# Patient Record
Sex: MALE | Race: BLACK OR AFRICAN AMERICAN | Employment: FULL TIME | URBAN - METROPOLITAN AREA
[De-identification: names, ages, dates, MRNs, and addresses within clinical notes are randomized per-mention and may not be internally consistent; named-entity substitution may affect disease eponyms.]

---

## 2019-10-10 ENCOUNTER — HOSPITAL ENCOUNTER (OUTPATIENT)
Dept: PHYSICAL THERAPY | Age: 29
Discharge: HOME | End: 2019-10-10
Payer: COMMERCIAL

## 2019-10-10 PROCEDURE — 97162 PT EVAL MOD COMPLEX 30 MIN: CPT

## 2019-10-10 PROCEDURE — 97530 THERAPEUTIC ACTIVITIES: CPT

## 2019-10-10 PROCEDURE — 97110 THERAPEUTIC EXERCISES: CPT
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
## 2019-10-17 ENCOUNTER — HOSPITAL ENCOUNTER (OUTPATIENT)
Dept: PHYSICAL THERAPY | Age: 29
Discharge: HOME | End: 2019-10-17
Payer: COMMERCIAL

## 2019-10-17 PROCEDURE — 97110 THERAPEUTIC EXERCISES: CPT

## 2019-10-17 NOTE — PROGRESS NOTES
PT DAILY TREATMENT NOTE    Patient Name: Johnathon Delarosa  Date:10/17/2019  : 1990  [x]  Patient  Verified  Payor: Josefina Harley / Plan: [address] [place] [address] / Product Type: PPO /    In time:9:20  Out time:10:00  Total Treatment Time (min): 40  Total Timed Codes (min): 40  1:1 Treatment Time ( only): 40   Visit #: 2 of 12    Treatment Area: Back pain [M54.9]    SUBJECTIVE  Pain Level (0-10 scale): 6  Any medication changes, allergies to medications, adverse drug reactions, diagnosis change, or new procedure performed?: [x] No    [] Yes (see summary sheet for update)  Subjective functional status/changes:   [] No changes reported  Just stiff    OBJECTIVE    Modalities Rationale:     decrease edema, decrease inflammation and decrease pain to improve patient's ability to stand for prolonged periods. min [] Estim, type/location:                                      []  att     []  unatt     []  w/US     []  w/ice    []  w/heat    min []  Mechanical Traction: type/lbs                   []  pro   []  sup   []  int   []  cont    []  before manual    []  after manual    min []  Ultrasound, settings/location:      min []  Iontophoresis w/ dexamethasone, location:                                               []  take home patch       []  in clinic    min []  Ice     []  Heat    location/position:     min []  Vasopneumatic Device, press/temp:     min []  Other:    [] Skin assessment post-treatment (if applicable):    []  intact    []  redness- no adverse reaction     []redness  adverse reaction:        40 min Therapeutic Exercise:  [] See flow sheet :   Rationale: increase ROM, increase strength and improve coordination to improve the patients ability to do ADLs.     With   [x] TE   [] TA   [] neuro   [] other: Patient Education: [x] Review HEP    [] Progressed/Changed HEP based on:   [] positioning   [] body mechanics   [] transfers   [] heat/ice application    [] other:      Other Objective/Functional Measures: Pt challenged with maintaining PPT improved with cueing. Pain Level (0-10 scale) post treatment: Pt challenged with rib depression and pelvic tilting during 90/90s. Unable to reach during post capsule stretch. ASSESSMENT/Changes in Function: 3    Patient will continue to benefit from skilled PT services to modify and progress therapeutic interventions, address functional mobility deficits, address ROM deficits, address strength deficits and analyze and address soft tissue restrictions to attain remaining goals. []  See Plan of Care  []  See progress note/recertification  []  See Discharge Summary         Progress towards goals / Updated goals:  1.  Pt will be independent with HEP to encourage prophylaxis. Eval:dispensed,to be updated   Current: added cat camel today     Long Term Goals: LTG- To be accomplished in 12 visit(s):  1.  Pt will improve trunk rotation to 15 in or less and forward flexion to floor without pain to allow pt to return to PLOF .  Eval:Right 19.5 in Left 20 in with pain, forward flexion 6 in from floor   Current:     2.  Pt will be able to work entire work day without pain after to allow pt to return PLOF. Eval:pain at end of day   current:     3.  Pt will be able to perform >10 bridges without pain to indicate functional glut and hamstring strength.   Eval: decreased height with one bridge and increased ext    Current:     4.  Pt FOTO score will increase by 20 points to show improvement in function. Eval:48   Current: will address at visit 5    PLAN  []  Upgrade activities as tolerated     []  Continue plan of care  []  Update interventions per flow sheet       []  Discharge due to:_  []  Other:_      Ivonne Melgar, PT 10/17/2019  9:25 AM    No future appointments.

## 2019-11-01 ENCOUNTER — HOSPITAL ENCOUNTER (OUTPATIENT)
Dept: PHYSICAL THERAPY | Age: 29
Discharge: HOME | End: 2019-11-01
Payer: COMMERCIAL

## 2019-11-01 PROCEDURE — 97110 THERAPEUTIC EXERCISES: CPT

## 2019-11-01 NOTE — PROGRESS NOTES
PT DAILY TREATMENT NOTE    Patient Name: Micheline Duarte  Date:2019  : 1990  [x]  Patient  Verified  Payor: Carina Andrews / Plan: [address] [place] [address] / Product Type: PPO /    In time:2:14 pm   Out time:3:10 pm   Total Treatment Time (min): 56  Total Timed Codes (min): 46  1:1 Treatment Time ( only): 55   Visit #: 3 of 12    Treatment Area: Back pain [M54.9]    SUBJECTIVE  Pain Level (0-10 scale): 6  Any medication changes, allergies to medications, adverse drug reactions, diagnosis change, or new procedure performed?: [x] No    [] Yes (see summary sheet for update)  Subjective functional status/changes:   [] No changes reported  \"Still kind of stiff. My left calf (indicated achilles) was really hurting the other day. \"    OBJECTIVE    Modalities Rationale:     decrease pain and increase tissue extensibility to improve patient's ability to perform daily activities with decreased pain and symptom levels     min [] Estim, type/location:                                      []  att     []  unatt     []  w/US     []  w/ice    []  w/heat    min []  Mechanical Traction: type/lbs                   []  pro   []  sup   []  int   []  cont    []  before manual    []  after manual    min []  Ultrasound, settings/location:      min []  Iontophoresis w/ dexamethasone, location:                                               []  take home patch       []  in clinic   10 min []  Ice     [x]  Heat    location/position: To L-spine in supine with LE elevated      min []  Vasopneumatic Device, press/temp:     min []  Other:    [x] Skin assessment post-treatment (if applicable):    [x]  intact    []  redness- no adverse reaction     []redness  adverse reaction:          46 min Therapeutic Exercise:  [x] See flow sheet :   Rationale: increase ROM, increase strength, improve coordination and increase proprioception to improve the patients ability to perform daily activities with decreased pain and symptom levels With   [] TE   [] TA   [] neuro   [] other: Patient Education: [x] Review HEP    [] Progressed/Changed HEP based on:   [] positioning   [] body mechanics   [] transfers   [] heat/ice application    [] other:      Other Objective/Functional Measures:   Trunk Rot Right: 17 in Left 18 in      Pain Level (0-10 scale) post treatment: 4    ASSESSMENT/Changes in Function:   Pt very challenged with quadruped- overuse of pecs. Very challenged with PPT. Required max cues. Patient will continue to benefit from skilled PT services to modify and progress therapeutic interventions, address functional mobility deficits, address ROM deficits, address strength deficits, analyze and address soft tissue restrictions, analyze and cue movement patterns, analyze and modify body mechanics/ergonomics, assess and modify postural abnormalities and instruct in home and community integration to attain remaining goals. []  See Plan of Care  []  See progress note/recertification  []  See Discharge Summary         Progress towards goals / Updated goals:  1.  Pt will be independent with HEP to encourage prophylaxis. Eval:dispensed,to be updated   Current: reports partial attempts and compliance, reviewed with pt this session      Long Term Goals: LTG- To be accomplished in 12 visit(s):  1.  Pt will improve trunk rotation to 15 in or less and forward flexion to floor without pain to allow pt to return to PLOF .  Eval:Right 19.5 in Left 20 in with pain, forward flexion 6 in from floor   Current: Progressing Trunk Rot Right: 17 in Left 18 in      2.  Pt will be able to work entire work day without pain after to allow pt to return PLOF.   Eval:pain at end of day   current:      3.  Pt will be able to perform >10 bridges without pain to indicate functional glut and hamstring strength.   Eval: decreased height with one bridge and increased ext    Current:      4.  Pt FOTO score will increase by 20 points to show improvement in function.   Eval:48   Current: will address at visit 5       PLAN  [x]  Upgrade activities as tolerated     []  Continue plan of care  []  Update interventions per flow sheet       []  Discharge due to:_  []  Other:_      Adrian Ramirez PT, DPT 11/1/2019  2:32 PM    Future Appointments   Date Time Provider Ketan Ferraro   11/7/2019  9:45 AM Natalie Lock PT [facility]

## 2019-11-07 ENCOUNTER — HOSPITAL ENCOUNTER (OUTPATIENT)
Dept: PHYSICAL THERAPY | Age: 29
Discharge: HOME | End: 2019-11-07
Payer: COMMERCIAL

## 2019-11-07 PROCEDURE — 97110 THERAPEUTIC EXERCISES: CPT

## 2019-11-07 NOTE — PROGRESS NOTES
PT DAILY TREATMENT NOTE    Patient Name: Raad De La Torre  Date:2019  : 1990  [x]  Patient  Verified  Payor: Mere June / Plan: [address] [place] [address] / Product Type: PPO /    In time:11:25 pm   Out time:12:30 pm   Total Treatment Time (min): 65  Total Timed Codes (min): 55  1:1 Treatment Time ( only): 55  Visit #: 4 of 12    Treatment Area: Back pain [M54.9]    SUBJECTIVE  Pain Level (0-10 scale): 6  Any medication changes, allergies to medications, adverse drug reactions, diagnosis change, or new procedure performed?: [x] No    [] Yes (see summary sheet for update)  Subjective functional status/changes:   [] No changes reported  \"Still kind of stiff. My left calf (indicated achilles) was really hurting the other day. \"    OBJECTIVE    Modalities Rationale:     decrease pain and increase tissue extensibility to improve patient's ability to perform daily activities with decreased pain and symptom levels     min [] Estim, type/location:                                      []  att     []  unatt     []  w/US     []  w/ice    []  w/heat    min []  Mechanical Traction: type/lbs                   []  pro   []  sup   []  int   []  cont    []  before manual    []  after manual    min []  Ultrasound, settings/location:      min []  Iontophoresis w/ dexamethasone, location:                                               []  take home patch       []  in clinic   10 min []  Ice     [x]  Heat    location/position: To L-spine in supine with LE elevated      min []  Vasopneumatic Device, press/temp:     min []  Other:    [x] Skin assessment post-treatment (if applicable):    [x]  intact    []  redness- no adverse reaction     []redness  adverse reaction:          55 min Therapeutic Exercise:  [x] See flow sheet :   Rationale: increase ROM, increase strength, improve coordination and increase proprioception to improve the patients ability to perform daily activities with decreased pain and symptom levels With   [] TE   [] TA   [] neuro   [] other: Patient Education: [x] Review HEP    [] Progressed/Changed HEP based on:   [] positioning   [] body mechanics   [] transfers   [] heat/ice application    [] other:      Other Objective/Functional Measures:   Pt has improved symptoms post-repositioning; added adductor pull back with good results. Pain Level (0-10 scale) post treatment: 4    ASSESSMENT/Changes in Function:   Pt has attended 4 skilled PT visits since 10/10/19. Pt has made good progress towards goals and has improved glute control. Pt to benefit from continued skilled PT 2x/week x 4 weeks. Patient will continue to benefit from skilled PT services to modify and progress therapeutic interventions, address functional mobility deficits, address ROM deficits, address strength deficits, analyze and address soft tissue restrictions, analyze and cue movement patterns, analyze and modify body mechanics/ergonomics, assess and modify postural abnormalities and instruct in home and community integration to attain remaining goals. []  See Plan of Care  []  See progress note/recertification  []  See Discharge Summary         Progress towards goals / Updated goals:  1.  Pt will be independent with HEP to encourage prophylaxis. Eval:dispensed,to be updated   Current: reports partial attempts and compliance, reviewed with pt this session, progressing.      Long Term Goals: LTG- To be accomplished in 12 visit(s):  1.  Pt will improve trunk rotation to 15 in or less and forward flexion to floor without pain to allow pt to return to PLOF .  Eval:Right 19.5 in Left 20 in with pain, forward flexion 6 in from floor   Current: Progressing Trunk Rot Right: 17 in Left 18 in, progressing.      2.  Pt will be able to work entire work day without pain after to allow pt to return PLOF.   Eval:pain at end of day   current: less pain at the end of work day, progressing.      3.  Pt will be able to perform >10 bridges without pain to indicate functional glut and hamstring strength.   Eval: decreased height with one bridge and increased ext    Current: improved glute contraction, progressing.      4.  Pt FOTO score will increase by 20 points to show improvement in function. Eval:48   Current: 53/100, progressing.        PLAN  [x]  Upgrade activities as tolerated     [x]  Continue plan of care  []  Update interventions per flow sheet       []  Discharge due to:_  []  Other:_      Rica Boggs, PT 11/7/2019  2:32 PM    No future appointments.

## 2019-11-07 NOTE — PROGRESS NOTES
[facility] at the [address], [facility], [address] [facility]  Phone: [phone]      Fax:  [phone]    PROGRESS NOTE  Patient Name: Gisela Washington : 1990   Treatment/Medical Diagnosis: Back pain [M54.9]   Referral Source: Sabas West NP     Date of Initial Visit: 10/10/19 Attended Visits: 4 Missed Visits: 1     SUMMARY OF TREATMENT  Pt has attended 4 skilled PT visits since 10/10/19. Pt has made good progress towards goals and has improved glute control. Pt to benefit from continued skilled PT 2x/week x 4 weeks. therex for strengthening, there act for functional tolerance, neuro re-ed for coordination, manual therapy to improve ROM and soft tissue mobility, and patient education for long-term management. CURRENT STATUS  .  Pt will be independent with HEP to encourage prophylaxis. Eval:dispensed,to be updated   Current: reports partial attempts and compliance, reviewed with pt this session, progressing.      Long Term Goals: LTG- To be accomplished in 12 visit(s):  1.  Pt will improve trunk rotation to 15 in or less and forward flexion to floor without pain to allow pt to return to PLOF .  Eval:Right 19.5 in Left 20 in with pain, forward flexion 6 in from floor   Current: Progressing Trunk Rot         Right: 17 in      Left 18 in, progressing.      2.  Pt will be able to work entire work day without pain after to allow pt to return PLOF. Eval:pain at end of day   current: less pain at the end of work day, progressing.      3.  Pt will be able to perform >10 bridges without pain to indicate functional glut and hamstring strength.   Eval: decreased height with one bridge and increased ext    Current: improved glute contraction, progressing.      4.  Pt FOTO score will increase by 20 points to show improvement in function. Eval:48   Current: 53/100, progressing.        RECOMMENDATIONS  Pt to continue skilled PT for 2x/4 weeks secondary to progression towards established goals. If you have any questions/comments please contact us directly at [phone]  Thank you for allowing us to assist in the care of your patient. Therapist Signature: Ana Rosa Starr PT Date: 11/7/2019     Time: 1:28 PM   NOTE TO PHYSICIAN:  PLEASE COMPLETE THE ORDERS BELOW AND FAX TO   [facility] . If you are unable to process this request in 24 hours please contact our office:[phone]    ___ I have read the above report and request that my patient continue as recommended.   ___ I have read the above report and request that my patient continue therapy with the following changes/special instructions:_________________________________________________________   ___ I have read the above report and request that my patient be discharged from therapy.      Physician Signature:        Date:       Time:

## 2020-01-07 NOTE — PROGRESS NOTES
[facility] at the [address], [place], [address] [facility]  Phone: [phone]      Fax:  [phone]  DISCHARGE SUMMARY  Patient Name: Jorge Malcolm : 1990   Treatment/Medical Diagnosis: Back pain [M54.9]   Referral Source: Elaine Vela NP     Date of Initial Visit: 10/10/19 Attended Visits: 4 Missed Visits: 1     SUMMARY OF TREATMENT  Pt has attended 4 skilled PT visits since 10/10/19. Pt has made good progress towards goals and has improved glute control    CURRENT STATUS   Pt will be independent with HEP to encourage prophylaxis. Eval:dispensed,to be updated   Current: reports partial attempts and compliance, reviewed with pt this session, progressing.      Long Term Goals: LTG- To be accomplished in 12 visit(s):  1.  Pt will improve trunk rotation to 15 in or less and forward flexion to floor without pain to allow pt to return to PLOF .  Eval:Right 19.5 in Left 20 in with pain, forward flexion 6 in from floor   Current: Progressing Trunk Rot         Right: 17 in      Left 18 in, progressing.      2.  Pt will be able to work entire work day without pain after to allow pt to return PLOF. Eval:pain at end of day   current: less pain at the end of work day, progressing.      3.  Pt will be able to perform >10 bridges without pain to indicate functional glut and hamstring strength.   Eval: decreased height with one bridge and increased ext    Current: improved glute contraction, progressing.      4.  Pt FOTO score will increase by 20 points to show improvement in function. Eval:48   Current: 53/100, progressing.   RECOMMENDATIONS  Discontinue therapy. Progressing towards or have reached established goals. If you have any questions/comments please contact us directly at  [phone]   Thank you for allowing us to assist in the care of your patient.   Therapist Signature: Carlo Dawkins PT Date: 2020     Time: 10:11 AM        Physician Signature: ________________________________ Date: ___________ Time:_____

## 2020-02-06 ENCOUNTER — APPOINTMENT (OUTPATIENT)
Dept: PHYSICAL THERAPY | Age: 30
End: 2020-02-06

## 2020-02-28 ENCOUNTER — HOSPITAL ENCOUNTER (OUTPATIENT)
Dept: PHYSICAL THERAPY | Age: 30
Discharge: HOME | End: 2020-02-28
Payer: COMMERCIAL

## 2020-02-28 PROCEDURE — 97161 PT EVAL LOW COMPLEX 20 MIN: CPT

## 2020-02-28 PROCEDURE — 97110 THERAPEUTIC EXERCISES: CPT

## 2020-02-28 PROCEDURE — 97530 THERAPEUTIC ACTIVITIES: CPT
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
## 2020-03-04 ENCOUNTER — HOSPITAL ENCOUNTER (OUTPATIENT)
Dept: PHYSICAL THERAPY | Age: 30
Discharge: HOME | End: 2020-03-04
Payer: COMMERCIAL

## 2020-03-04 PROCEDURE — 97110 THERAPEUTIC EXERCISES: CPT

## 2020-03-04 NOTE — PROGRESS NOTES
PT DAILY TREATMENT NOTE    Patient Name: Harriet Wright  Date:3/4/2020  : 1990  [x]  Patient  Verified  Payor: Nelda Mason / Plan: [address] [place] [address] / Product Type: PPO /    In time:11:40  Out time:12:35  Total Treatment Time (min): 55  Total Timed Codes (min): 55  1:1 Treatment Time ( only): 54   Visit #: 2 of 12    Treatment Area: Lumbar back pain [M54.5]    SUBJECTIVE  Pain Level (0-10 scale): 3-4  Any medication changes, allergies to medications, adverse drug reactions, diagnosis change, or new procedure performed?: [x] No    [] Yes (see summary sheet for update)  Subjective functional status/changes:   [] No changes reported  \"homework is okay, the one on all 4's is still hard. \"    OBJECTIVE        55 min Therapeutic Exercise:  [x] See flow sheet :   Rationale: increase ROM and increase strength to improve the patients ability to perform daily activities with decreased pain and symptom levels      With   [] TE   [] TA   [] neuro   [] other: Patient Education: [x] Review HEP    [] Progressed/Changed HEP based on:   [] positioning   [] body mechanics   [] transfers   [] heat/ice application    [] other:      Other Objective/Functional Measures:   Challenged with seated hip IR due to fatigue     Pain Level (0-10 scale) post treatment: 3    ASSESSMENT/Changes in Function: Good tolerance to exercises with no increase in pain and reporting decreased tightness at end of session. Improved PPT form with use of wedge. Patient will continue to benefit from skilled PT services to modify and progress therapeutic interventions, address functional mobility deficits, address ROM deficits, address strength deficits, analyze and cue movement patterns, analyze and modify body mechanics/ergonomics, assess and modify postural abnormalities and instruct in home and community integration to attain remaining goals.      []  See Plan of Care  []  See progress note/recertification  []  See Discharge Summary Progress towards goals / Updated goals:  Short Term Goals: STG- To be accomplished in 2 week(s):  1.  Pt will be independent with HEP to encourage prophylaxis. Eval:HEP dispensed   Current: partial compliance      Long Term Goals: LTG- To be accomplished in 6 week(s):  1.  Pt will report >/=75% reduction in symptoms to be able to complete ADLs with less pain. Eval: 8/10 max pain in right low back pain at end of day, standing for long periods and bending     2.  Pt trunk rotation will improve to 16in or less to demonstrate improved functional mobilty. Eval:20.5in right, 20in left      3.  Pt will be able to squat with good form and no pain to demonstrate improved functional glut strength  Eval:increased lumbar extension, heel lift      4.  Pt FOTO score will increase by >/= 18 points to show improvement in subjective function. Eval:46  Current: will address at visit 5    PLAN  [x]  Upgrade activities as tolerated     [x]  Continue plan of care  []  Update interventions per flow sheet       []  Discharge due to:_  []  Other:_      Carry Diony 3/4/2020  12:01 PM    No future appointments.

## 2020-03-11 ENCOUNTER — HOSPITAL ENCOUNTER (OUTPATIENT)
Dept: PHYSICAL THERAPY | Age: 30
Discharge: HOME | End: 2020-03-11
Payer: COMMERCIAL

## 2020-03-11 PROCEDURE — 97110 THERAPEUTIC EXERCISES: CPT

## 2020-03-11 NOTE — PROGRESS NOTES
PT DAILY TREATMENT NOTE    Patient Name: Deyanira oNeer  Date:3/11/2020  : 1990  [x]  Patient  Verified  Payor: Patricia Smith / Plan: [address] [place] [address] / Product Type: PPO /    In time:11:33  Out time:12:47  Total Treatment Time (min): 74  Total Timed Codes (min): 74  1:1 Treatment Time ( only): 50   Visit #: 3 of 12    Treatment Area: Lumbar back pain [M54.5]    SUBJECTIVE  Pain Level (0-10 scale): 0  Any medication changes, allergies to medications, adverse drug reactions, diagnosis change, or new procedure performed?: [x] No    [] Yes (see summary sheet for update)  Subjective functional status/changes:   [] No changes reported  \"Just tight. \"    OBJECTIVE      74 min Therapeutic Exercise:  [x] See flow sheet :   Rationale: increase ROM and increase strength to improve the patients ability to perform daily activities with decreased pain and symptom levels    With   [] TE   [] TA   [] neuro   [] other: Patient Education: [x] Review HEP    [] Progressed/Changed HEP based on:   [] positioning   [] body mechanics   [] transfers   [] heat/ice application    [] other:      Other Objective/Functional Measures:   Very challenged in down dog position with easily fatiguing     Pain Level (0-10 scale) post treatment: 0    ASSESSMENT/Changes in Function: Good tolerance to exercises with no pain only tightness at end of session. Easily fatigued with down dog taps today with cues to decrease pelvic rotation due to decreased strength. Pt reporting improved compliance with HEP at home. Patient will continue to benefit from skilled PT services to modify and progress therapeutic interventions, address functional mobility deficits, address ROM deficits, address strength deficits, analyze and cue movement patterns, analyze and modify body mechanics/ergonomics, assess and modify postural abnormalities and instruct in home and community integration to attain remaining goals.      []  See Plan of Care  []  See progress note/recertification  []  See Discharge Summary         Progress towards goals / Updated goals:  Short Term Goals: STG- To be accomplished in 2 week(s):  1.  Pt will be independent with HEP to encourage prophylaxis. Eval:HEP dispensed   Current: improving compliance per pt report progressing    Long Term Goals: LTG- To be accomplished in 6 week(s):  1.  Pt will report >/=75% reduction in symptoms to be able to complete ADLs with less pain. Eval: 8/10 max pain in right low back pain at end of day, standing for long periods and bending  Current:      2.  Pt trunk rotation will improve to 16in or less to demonstrate improved functional mobilty. Eval:20.5in right, 20in left   Curent: decreased rotation noted with open book     3.  Pt will be able to squat with good form and no pain to demonstrate improved functional glut strength  Eval:increased lumbar extension, heel lift   Current: improved with verbal cues     4.  Pt FOTO score will increase by >/= 18 points to show improvement in subjective function. Eval:46  Current: will address at visit 5    PLAN  [x]  Upgrade activities as tolerated     [x]  Continue plan of care  []  Update interventions per flow sheet       []  Discharge due to:_  []  Other:_      Marcio Jean 3/11/2020  11:35 AM    No future appointments.

## 2020-03-31 ENCOUNTER — DOCUMENTATION ONLY (OUTPATIENT)
Dept: PHYSICAL THERAPY | Age: 30
End: 2020-03-31

## 2020-03-31 NOTE — PROGRESS NOTES
[facility] at [facility] [address], [place], [address] [facility]  Phone: [phone]      Fax:  [phone]    Progress Note  Patient name: Denver Izaguirre Start of Care: 2020   Referral source: Kenny Mejia NP : 1990               Medical Diagnosis: Lumbar back pain [M54.5]    Onset Date:[facility] 2020               Treatment Diagnosis: low back pain   Prior Hospitalization: see medical history Provider#: [id number]   Medications: Verified on Patient summary List    Comorbidities: HTN, hernia repaired when kid, bilateral ankle sprains    Prior Level of Function: playing basketball, lawn care, working without back pain    Visits from Start of Care: 3    Missed Visits: 0    Progress Towards Goals:   Short Term Goals: STG- To be accomplished in 2 week(s):  1.  Pt will be independent with HEP to encourage prophylaxis. Eval:HEP dispensed   Current: improving compliance per pt report progressing     Long Term Goals: LTG- To be accomplished in 6 week(s):  1.  Pt will report >/=75% reduction in symptoms to be able to complete ADLs with less pain. Eval: 8/10 max pain in right low back pain at end of day, standing for long periods and bending  Current: no pain at end of session progressing      2.  Pt trunk rotation will improve to 16in or less to demonstrate improved functional mobilty. Eval:20.5in right, 20in left   Curent: decreased rotation noted with open book progressing      3.  Pt will be able to squat with good form and no pain to demonstrate improved functional glut strength  Eval:increased lumbar extension, heel lift   Current: improved with verbal cues progressing      4.  Pt FOTO score will increase by >/= 18 points to show improvement in subjective function.   Eval:46  Current: will address at visit 5       Key Functional Changes: Pt has been making great progress towards goals however is to be placed on hold at this time due public health concerns for COVID19. Pt has been given an updated HEP with option of PT sending out an updated HEP via e-mail if needed to stay in communication with therapist. If pt's symptoms return and are unable to be managed with exercises at home pt educated to follow-up with therapist to be taken off hold as appropriate. Patient care will resume to previous established frequency when public health crisis has subsided. Updated Goals: to be achieved in 8 treatments:     See goals above    ASSESSMENT/RECOMMENDATIONS:  [x]Continue therapy per initial plan/protocol at a frequency of  2 x per week for 4 weeks  []Continue therapy with the following recommended changes:_____________________      _____________________________________________________________________  []Discontinue therapy progressing towards or have reached established goals  []Discontinue therapy due to lack of appreciable progress towards goals  []Discontinue therapy due to lack of attendance or compliance  []Await Physician's recommendations/decisions regarding therapy  []Other:________________________________________________________________    Thank you for this referral.   Pierre WREN Remesic 3/31/2020 1:19 PM  NOTE TO PHYSICIAN:  [address]   FAX TO [facility]: [phone]  If you are unable to process this request in 24 hours please contact our office: [phone]        []  I have read the above report and request that my patient continue as recommended. []  I have read the above report and request that my patient continue therapy with the following changes/special instructions:________________________________________  []I have read the above report and request that my patient be discharged from therapy.     [de-identified] Signature:____________Date:_________TIME:________    [facility], Date and Time must be completed for valid certification **
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
## 2022-02-11 ENCOUNTER — HOSPITAL ENCOUNTER (OUTPATIENT)
Dept: PREADMISSION TESTING | Age: 32
Discharge: HOME | End: 2022-02-11
Payer: COMMERCIAL

## 2022-02-11 PROCEDURE — U0003 INFECTIOUS AGENT DETECTION BY NUCLEIC ACID (DNA OR RNA); SEVERE ACUTE RESPIRATORY SYNDROME CORONAVIRUS 2 (SARS-COV-2) (CORONAVIRUS DISEASE [COVID-19]), AMPLIFIED PROBE TECHNIQUE, MAKING USE OF HIGH THROUGHPUT TECHNOLOGIES AS DESCRIBED BY CMS-2020-01-R: HCPCS

## 2022-02-12 LAB — SARS-COV-2, COV2NT: NOT DETECTED

## 2022-02-16 ENCOUNTER — HOSPITAL ENCOUNTER (OUTPATIENT)
Age: 32
Setting detail: OUTPATIENT SURGERY
Discharge: HOME | End: 2022-02-16
Attending: INTERNAL MEDICINE | Admitting: INTERNAL MEDICINE
Payer: COMMERCIAL

## 2022-02-16 VITALS
DIASTOLIC BLOOD PRESSURE: 88 MMHG | WEIGHT: 258.1 LBS | HEART RATE: 82 BPM | BODY MASS INDEX: 38.23 KG/M2 | TEMPERATURE: 96.9 F | HEIGHT: 69 IN | OXYGEN SATURATION: 100 % | RESPIRATION RATE: 16 BRPM | SYSTOLIC BLOOD PRESSURE: 155 MMHG

## 2022-02-16 PROCEDURE — 88305 TISSUE EXAM BY PATHOLOGIST: CPT

## 2022-02-16 PROCEDURE — G0500 MOD SEDAT ENDO SERVICE >5YRS: HCPCS | Performed by: INTERNAL MEDICINE

## 2022-02-16 PROCEDURE — 2709999900 HC NON-CHARGEABLE SUPPLY: Performed by: INTERNAL MEDICINE

## 2022-02-16 PROCEDURE — 76040000007: Performed by: INTERNAL MEDICINE

## 2022-02-16 PROCEDURE — 74011250636 HC RX REV CODE- 250/636: Performed by: INTERNAL MEDICINE

## 2022-02-16 PROCEDURE — 77030039961 HC KT CUST COLON BSC -D: Performed by: INTERNAL MEDICINE

## 2022-02-16 PROCEDURE — 77030013991 HC SNR POLYP ENDOSC BSC -A: Performed by: INTERNAL MEDICINE

## 2022-02-16 PROCEDURE — 77030040361 HC SLV COMPR DVT MDII -B: Performed by: INTERNAL MEDICINE

## 2022-02-16 RX ORDER — FLUMAZENIL 0.1 MG/ML
0.2 INJECTION INTRAVENOUS
Status: DISCONTINUED | OUTPATIENT
Start: 2022-02-16 | End: 2022-02-16 | Stop reason: HOSPADM

## 2022-02-16 RX ORDER — NALOXONE HYDROCHLORIDE 0.4 MG/ML
0.4 INJECTION, SOLUTION INTRAMUSCULAR; INTRAVENOUS; SUBCUTANEOUS
Status: DISCONTINUED | OUTPATIENT
Start: 2022-02-16 | End: 2022-02-16 | Stop reason: HOSPADM

## 2022-02-16 RX ORDER — FENTANYL CITRATE 50 UG/ML
100 INJECTION, SOLUTION INTRAMUSCULAR; INTRAVENOUS
Status: DISCONTINUED | OUTPATIENT
Start: 2022-02-16 | End: 2022-02-16 | Stop reason: HOSPADM

## 2022-02-16 RX ORDER — SODIUM CHLORIDE 9 MG/ML
1000 INJECTION, SOLUTION INTRAVENOUS CONTINUOUS
Status: CANCELLED | OUTPATIENT
Start: 2022-02-16 | End: 2022-02-16

## 2022-02-16 RX ORDER — ATROPINE SULFATE 0.1 MG/ML
0.5 INJECTION INTRAVENOUS
Status: CANCELLED | OUTPATIENT
Start: 2022-02-16 | End: 2022-02-17

## 2022-02-16 RX ORDER — LANOLIN ALCOHOL/MO/W.PET/CERES
1000 CREAM (GRAM) TOPICAL DAILY
COMMUNITY

## 2022-02-16 RX ORDER — HYDRALAZINE HYDROCHLORIDE 20 MG/ML
INJECTION INTRAMUSCULAR; INTRAVENOUS AS NEEDED
Status: DISCONTINUED | OUTPATIENT
Start: 2022-02-16 | End: 2022-02-16 | Stop reason: HOSPADM

## 2022-02-16 RX ORDER — SODIUM CHLORIDE 9 MG/ML
125 INJECTION, SOLUTION INTRAVENOUS CONTINUOUS
Status: DISCONTINUED | OUTPATIENT
Start: 2022-02-16 | End: 2022-02-16 | Stop reason: HOSPADM

## 2022-02-16 RX ORDER — SODIUM CHLORIDE 0.9 % (FLUSH) 0.9 %
5-40 SYRINGE (ML) INJECTION AS NEEDED
Status: CANCELLED | OUTPATIENT
Start: 2022-02-16

## 2022-02-16 RX ORDER — DIPHENHYDRAMINE HYDROCHLORIDE 50 MG/ML
50 INJECTION, SOLUTION INTRAMUSCULAR; INTRAVENOUS ONCE
Status: CANCELLED | OUTPATIENT
Start: 2022-02-16 | End: 2022-02-16

## 2022-02-16 RX ORDER — EPINEPHRINE 0.1 MG/ML
1 INJECTION INTRACARDIAC; INTRAVENOUS
Status: CANCELLED | OUTPATIENT
Start: 2022-02-16 | End: 2022-02-17

## 2022-02-16 RX ORDER — SODIUM CHLORIDE 0.9 % (FLUSH) 0.9 %
5-40 SYRINGE (ML) INJECTION EVERY 8 HOURS
Status: CANCELLED | OUTPATIENT
Start: 2022-02-16

## 2022-02-16 RX ORDER — MIDAZOLAM HYDROCHLORIDE 1 MG/ML
.25-5 INJECTION, SOLUTION INTRAMUSCULAR; INTRAVENOUS
Status: DISCONTINUED | OUTPATIENT
Start: 2022-02-16 | End: 2022-02-16 | Stop reason: HOSPADM

## 2022-02-16 RX ORDER — LISINOPRIL AND HYDROCHLOROTHIAZIDE 12.5; 2 MG/1; MG/1
1 TABLET ORAL DAILY
COMMUNITY
Start: 2021-05-21

## 2022-02-16 RX ORDER — DEXTROMETHORPHAN/PSEUDOEPHED 2.5-7.5/.8
1.2 DROPS ORAL
Status: CANCELLED | OUTPATIENT
Start: 2022-02-16

## 2022-02-16 RX ADMIN — SODIUM CHLORIDE 125 ML/HR: 9 INJECTION, SOLUTION INTRAVENOUS at 10:47

## 2022-02-16 NOTE — H&P
Assessment/Plan  # Detail Type Description    1. Assessment Bleeding of rectum (K62.5). Patient Plan *Labs & Stool studies ordered: IBD w/u (Prior to c-scope)  ______________________________________________  *C-scope Plan:  Colonoscopy ordered with Dr. Phillip Wise with Miralax bowel prep, and Mag Citrate 2 days before prep, and Miralax and stool softeners starting 3 days before prep.  -Patient is advised that they should take their aspirin (if prescribed) up until the day of procedure.  -Patient is advised to take Thyroid meds, BP meds, beta blockers, and any cardiac meds the AM of procedure with sip clear liquid after prep. *C-scope Risks:  Stressed importance of following all bowel preparation instructions. Explained the procedure to the patient including all risks and benefits. These risks consist of missed lesions on exam, bleeding, and bowel perforation with possible need for admission to the hospital, and in the most extensive of  circumstances, the patient may require surgery. Pt verbalized understanding of these risks and is agreeable with this procedure    Plan Orders ANCA Panel to be performed. , C difficile Toxins A+B, EIA to be performed. , C-Reactive Protein, Quant to be performed. , Calprotectin, Fecal to be performed. , CBC With Differential/Platelet to be performed. , Celiac Disease Comprehensive to be performed. , Comp. Metabolic Panel (14) to be performed. , Ferritin, Serum to be performed., Giardia, EIA; Ova/Parasite to be performed., H. pylori Stool Ag, EIA to be performed., Iron and TIBC to be performed., Lipase, Serum to be performed., Occult Blood, Fecal, IA to be performed., Ova + Parasite Exam to be performed. , Pancreatic Elastase, Fecal to be performed., Saccharomyces cerevisiae Panel to be performed. and Vitamin B12 and Folate to be performed. Further diagnostic evaluations ordered today include(s) DIAGNOSTIC COLONOSCOPY to be performed. 2. Assessment Mucus in stool (R19.5). Impression Mostly sees mixed bloody mucus on stool now. 3. Assessment Abdominal pain (R10.9). Impression Pt reports generalized low abdominal pain present upon waking in the morning that is intermittent and randomly occurring. He states this is a dull cramping pain and it typically settles within a few hours of being awake. It does not happen consistently, and may occur a few days in a row, then no pain for a few weeks. 4. Assessment History of COVID-19 (Z86.16). Impression Pt states he had COVID-19 in 2021, and had diarrhea at that time. Since then he will have diarrhea intermittently if he eats fatty or greasy foods, but mostly reports [place] stool #3 or #4.         5. Assessment Family history of malignant neoplasm of digestive organ (Z80.0). Impression *FHx of Cancer:   PGF- Colon cancer (Dx'd: 66's, : 81yo)   Father- Prostate Cancer (Dx'd: 52's) Living  PU- Prostate Cancer (Dx'd: 52's) Living  *Denies FHx of IBD or Celiac Dz. 6. Assessment Body mass index (BMI) 39.0-39.9, adult (Z68.39). Impression BMI: 39.53, Pt is obese with an Average Frame. These factors increase risks of procedural complications with sedation, and compromise airway maintenance. Special attention to airway management. This 33year old  patient was referred by Jewel Ontiveros. This 33year old male presents for Blood in stool. History of Present Illness  1. Blood in stool   Onset: 6 months ago. Severity level is mild. Location is Anal.  Duration 6 Months. Quality:  BRBPR w/ bowel movement Mucus in stool. It occurs constantly. The problem is unchanged. Associated symptoms include abdominal pain. Pertinent negatives include abdominal distention, bloating, change in bowel habits, constipation, decreased appetite, diarrhea, dysphagia, heartburn, nausea, perirectal itching, rectal pain, rectal pain associated with bleeding, vomiting and weight loss.   Additional information: Pt states when the blood comes out the stool it looks like mucus. BM: 3-4x daily  Never had an EGD or colonoscopy before. Comments: *FHx of Cancer:   PGF- Colon cancer (Dx'd: 66's, : 81yo)   Father- Prostate Cancer (Dx'd: 52's) Living  PU- Prostate Cancer (Dx'd: 52's) Living      Problem List  Problem Description Onset Date Chronic Clinical Status Notes   BMI 40.0-44.9, adult 2016 Y     Hyperlipidemia 06/15/2012 Y     Benign essential hypertension 07/10/2012 Y       Problem List (not yet mapped to SNOMED-CT®)  Problem Description Onset Date Notes   Routine general medical exam 03/15/2012    Screening exam for venereal disease 2012      Past Medical/Surgical History   (Detailed)  Disease/disorder Onset Date Management Date Comments   COVID-19 2021        Hernia repair, inguinal     Hypertension             Family History   (Detailed)    Relationship Family Member Name  Age at Death Condition Onset Age Cause of Death   Brother  N   13 N   Father  N  Hypertension  N   Father    Prostate Cancer (Dx'd: 52's)  N   Maternal grandfather    Cancer, lung  N   Maternal grandmother    Cardiovascular disease  N   Mother  N  Sarcoidosis  N   Paternal grandfather  Y 80 Colon cancer (Dx'd: 66's)  N   Paternal grandmother    Renal disease  N   Paternal uncle    Prostate Cancer (Dx'd: 52's)  N   Family History Comments  Relationship Family Member Name Condition Comments   Brother   sudden death- cardiomyopathy     Social History  (Detailed)  Tobacco use reviewed. Preferred language is [place]. Marital Status/Family/Social Support  Marital status: Single     Tobacco use status: Cigar Smoker. Smoking status: Current some day smoker. Tobacco Screening  Patient has used tobacco.     Smoking Status  Type Smoking Status Usage Per Day Years Used Pack Years Total Pack Years   Cigar Current some day smoker 4 Cigars        Vaping Use  Screened for vaping?  Yes  Status: Not a current user    Alcohol  There is a history of alcohol use. consumed rarely. Caffeine  The patient uses caffeine: tea. Lifestyle  Moderate activity level. 4 days a week usually. Diet  healthier diet. Medications (active prior to today)  Medication Instructions Start Date Stop Date Refilled Elsewhere   lisinopril 20 mg-hydrochlorothiazide 12.5 mg tablet TAKE ONE TABLET BY MOUTH EVERY DAY 05/21/2021 05/21/2021 N     Patient Status   Completed with information received for patient in a summary of care record. Medications (Added, Continued or Stopped today)  Start Date Medication Directions PRN Status PRN Reason Instruction Stop Date   05/21/2021 lisinopril 20 mg-hydrochlorothiazide 12.5 mg tablet TAKE ONE TABLET BY MOUTH EVERY DAY N        Allergies  Ingredient Reaction (Severity) Medication Name Comment   IODINE swelling (mild to moderate)     NUT FLAVOR      PENICILLINS rash (mild)     SHELLFISH DERIVED      SULFA (SULFONAMIDE ANTIBIOTICS) Swelling (mild to moderate)       Reviewed, no changes.       Orders  Status Lab Order Time Frame Comments   ordered follow-up visit if not improved     ordered follow-up visit if not improved     result received Basic Metabolic Panel (8) AU     ordered follow-up visit 1 Month 1 Month    ordered follow-up visit 3 Months 3 Months    result received Ct, Ng, Trich vag by ALLI     result received Urine Culture, Routine     scheduled US EXAM, SCROTUM     result received CBC With Differential/Platelet     result received Comp Metabolic Panel (14) AU     result received Lipid Panel calc LDL     result received HIV-1/2 Ag/Ab     result received Treponema pallidum     result received Ct, Ng, Trich vag by ALLI     result received HSV 1 and 2-Specific Ab, IgG     ordered follow-up visit with Randi SIMS if not improved     ordered follow-up visit with Randi SIMS if not improved     result received HIV-1/2 Ag/Ab     result received Treponema pallidum result received Ct, Ng, Trich vag by ALLI     result received Lipid Panel calc LDL     result received TSH     result received CBC With Differential/Platelet     result received Comp Metabolic Panel (14) AU     result received Treponema pallidum     result received HIV-1/2 Ag/Ab     result received Ct, Ng, Trich vag by ALLI     ordered Stress Echo     result received HSV 1 and 2-Specific Ab, IgG     ordered CBC With Differential/Platelet     ordered Comp Metabolic Panel (14) AU     ordered Lipid Panel calc LDL     ordered TSH     ordered Comp Metabolic Panel (14) AU     ordered LDL Cholesterol (Direct) AU     ordered Triglycerides  AU     result received X-RAY LOWER SPINE, 4+ Views     scheduled Referrals: Orthopedics. Chelsie Prado MD. Evaluate and treat     ordered MRI LUMBAR SPINE W/O DYE     ordered CBC With Differential/Platelet     ordered Comp Metabolic Panel (14) AU     ordered Lipid Panel calc LDL     ordered TSH     ordered HIV-1/2 Ag/Ab     ordered Treponema pallidum     result received Ct, Ng, Trich vag by ALLI     ordered Chest PA & Lateral     ordered Referrals: Gastroenterology. Grant Gordillo MD. Evaluate and treat     ordered UA In Office No Micro     ordered Urine Culture, Routine     ordered Referrals: Urology. Yaneth Shelton MD. Evaluate and treat     ordered Referrals: Sleep Health. Calvin Garcia MD. Evaluate and treat     ordered Comp Metabolic Panel (14) AU     ordered Triglycerides  AU     ordered LDL Cholesterol (Direct) AU     ordered HCV Antibody     ordered Hemoglobin A1c     ordered Referrals: Neurology. Valeria Recinos MD. Evaluate and treat     ordered Calprotectin, Fecal     ordered CBC With Differential/Platelet     ordered Comp.  Metabolic Panel (14)     ordered Celiac Disease Comprehensive     ordered C-Reactive Protein, Quant     ordered Pancreatic Elastase, Fecal     ordered Ova + Parasite Exam     ordered Giardia, EIA; Ova/Parasite     ordered H. pylori Stool Ag, EIA ordered Ferritin, Serum     ordered Iron and TIBC     ordered Occult Blood, Fecal, IA     ordered Saccharomyces cerevisiae Panel     ordered ANCA Panel     ordered Vitamin B12 and Folate     ordered Lipase, Serum     ordered C difficile Toxins A+B, EIA     ordered DIAGNOSTIC COLONOSCOPY         Review of Systems  System Neg/Pos Details   Constitutional Negative Fever and Weight loss. ENMT Negative Dysphagia and Sinus Infection. Eyes Negative Double vision. Respiratory Negative Asthma, Chronic cough and Dyspnea. Cardio Negative Chest pain, Edema and Irregular heartbeat/palpitations. GI Positive Abdominal pain. GI Negative Abdominal distention, Bloating, Change in bowel habits, Constipation, Decreased appetite, Diarrhea, Dysphagia, Heartburn, Hematemesis, Hematochezia, Melena, Nausea, Rectal pain, Rectal pain associated w/ bleeding, Reflux and Vomiting.  Negative Dysuria and Hematuria. Endocrine Negative Cold intolerance and Heat intolerance. Neuro Negative Dizziness, Headache, Numbness and Tremors. Psych Negative Anxiety, Depression and Increased stress. Integumentary Negative Hives, Perirectal itching, Pruritus and Rash. MS Negative Back pain, Joint pain and Myalgia. Hema/Lymph Negative Easy bleeding, Easy bruising and Lymphadenopathy. Allergic/Immuno Negative Food allergies and Immunosuppression. Vital Signs   Height  Time ft in cm Last Measured Height Position   9:12 AM 5.0 8.00 172.72 11/08/2021 0     Weight/BSA/BMI  Time lb oz kg Context BMI kg/m2 BSA m2   9:12 .00  117.934  39.53 2.38     Vitals (last day)    Date/Time Temp Pulse BP Cardiac Rhythm Who   02/16/22 1020 96.9 °F (36.1 °C) 75 143/96 Abnormal  -- HE       Respiratory Therapy (last day)    Date/Time Resp SpO2 O2 Device O2 Flow Rate (L/min) Who   02/16/22 1020 16 100 % None (Room air) -- HE       Physical  Exam  Exam Findings Details   Constitutional Normal Well developed.    Eyes Normal Conjunctiva - Right: Normal, Left: Normal. Sclera - Right: Normal, Left: Normal.   Nasopharynx Normal Lips/teeth/gums - Normal.   Neck Exam Normal Inspection - Normal.   Respiratory Normal Inspection - Normal.   Cardiovascular Normal Regular rate and rhythm. No murmurs, gallops, or rubs. Vascular Normal Pulses - Brachial: Normal.   Abdomen * Obese. Skin Normal Inspection - Normal.   Musculoskeletal Normal Hands/Wrist - Right: Normal, Left: Normal.   Extremity Normal No edema. Neurological Normal Fine motor skills - Normal.   Psychiatric Normal Orientation - Oriented to time, place, person & situation. Appropriate mood and affect.      Active Patient Care Team Members  Name Contact Agency Type Support Role Relationship Active Date Inactive Date Specialty   Louretta Most    Parent, Child is the Patient      Jaylene Doherty   encounter provider    Gastroenterology   Taya Orourke   Patient provider PCP   Family Practice       No change in H&P

## 2022-02-16 NOTE — PROCEDURES
[facility]  Colonoscopy Procedure Report  _______________________________________________________  Patient: Bryce Arevalo                                        Attending Physician: Jose Feng MD    Patient ID: [id number]                                    Referring Physician: Winnie Hubbard NP    Exam Date: 2/16/2022      Introduction: A  33 y.o. male patient, presents for inpatient Colonoscopy    Indications: Pt of BETHANY Hauser, referred to GI for evaluation and treatment of blood in the stool. Blood in stool experienced for the last 6 months, constantly, and on a daily basis. BRBPR w/BM, with occasional post-BM bloody mucus discharge noted when wiping. This began with wipe-type, then seen as rare drops in the toilet bowl. Now mainly seen as mixed bloody mucus on stool itself. -Never had an EGD or colonoscopy before. BM: 4 to 5 stools daily. PM/SH: HTN (Lisinopril), s/p inguinal hernia repair. No reported hx of of other abdominal surgeries, strokes, or CAD. Consent: The benefits, risks, and alternatives to the procedure were discussed and informed consent was obtained from the patient. Preparation: EKG, pulse, pulse oximetry and blood pressure were monitored throughout the procedure. ASA Classification: Class II- . The heart is an S1-S2 and regular heart rate and rhythm. Lungs are clear to auscultation and percussion. Abdomen is soft, nondistended, and nontender. Mental Status: awake, alert, and oriented to person, place, and time    Medications:  · Fentanyl 200 mcg IV before procedure. · Versed 7 mg IV and Hydralazine 10 mg IV throughout the procedure. Rectal Exam: Very hypertonic anal sphincter tone. No Blood. Prostate not enlarged    Pathology Specimens:  1    Procedure: The colonoscope was passed with difficulty through the anus under direct visualization and advanced to the cecum. The patient required positioning on the back to aid in the passage of the scope.  The scope was withdrawn and the mucosa was carefully examined. The quality of the preparation was good. The views were excellent. The patient's toleration of the procedure was fair. The exam was done twice to the cecum. Total time is 22 minutes and withdrawal time is 12 minutes. Findings:    Rectum:   Small internal hemorrhoids. Sigmoid:   Tortuous but mostly very loopy sigmoid colon with mild sigmoid diverticulosis. 8 mm pedunculated polyp in the mid sigmoid at 25 cm, hot snared. Descending Colon:   Normal   Transverse Colon:   Normal   Ascending Colon:   Normal  Cecum:   Normal  Terminal Ileum:   Not entered. Unplanned Events: There were no unplanned events. Estimated Blood Loss: None  IMPLANTS: * No implants in log *  Impressions: Hypertonic anal sphincter. Small internal hemorrhoids. Difficult colonoscopy. Tortuous but mostly very loopy sigmoid colon with mild sigmoid diverticulosis. 8 mm pedunculated polyp in the mid sigmoid at 25 cm, hot snared. Normal Mucosa. No blood or AVM found. Complications: None; patient tolerated the procedure well. Recommendations:  · Discharge home when standard parameters are met. · Resume a high fiber diet. · Resume own medications. Avoid all NSAID's for 7 days  · Colonoscopy recommendation in 7 years pending the result of the histology.   · Take Miralax and/ or Colace 100 mg on regular basis if constipated    Procedure Codes:    Brennen Agarwal [[id number]]    Endoscope Information:  Model Number(s)    [id number]   Assistant: None  Signed By: Macy Greenwood MD Date: 2/16/2022

## 2022-02-16 NOTE — PERIOP NOTES
Pt did not want to remove his necklace intraop.  Noted as gold chain with circular lion pendant with will

## 2022-02-16 NOTE — PROGRESS NOTES
Dr. Joaquina Mccormack made aware of patients BP of 166/116 (see flowsheets) patient reports running out of BP medication to Albert Ino in pre-op. Dr. Kriss Miranda has no orders at this time.

## 2022-02-16 NOTE — DISCHARGE INSTRUCTIONS
Beatrice Ch   _____________________________________________________________________________________________________________________________  [id number]  1990    COLON DISCHARGE INSTRUCTIONS    Discomfort:  Redness at IV site- apply warm compress to area; if redness or soreness persist- contact your physician  There may be a slight amount of blood passed from the rectum  Gaseous discomfort- walking, belching will help relieve any discomfort  You may not operate a vehicle til the next day. You may not engage in an occupation involving machinery or appliances til the next day. You may not drink alcoholic beverages til the next day. DIET:   High fiber diet. ACTIVITY:  You may not  resume your normal daily activities til the next day. it is recommended that you spend the remainder of the day resting -  avoid any strenuous activity. CALL M.D.  IF ANY SIGN OF:   Increasing pain, nausea, vomiting  Abdominal distension (swelling)  New increased bleeding (oral or rectal)  Fever (chills)  Pain in chest area  Bloody discharge from nose or mouth  Shortness of breath    You may not  take any Advil, Aspirin, Ibuprofen, Motrin, Aleve, or Goodys for 7 days, ONLY  Tylenol as needed for pain. Post procedure diagnosis:  internal hemorrhoids; tortuous sigmoid; polyp    Follow-up Instructions: Your follow up colonoscopy will be in 7 years pending the result of the histology. We will notify you the results of your biopsy by letter within 2 weeks. Brian Elizondo MD  February 16, 2022       DISCHARGE SUMMARY from Nurse    The following personal items collected during your admission are returned to you:   Dental Appliance: Dental Appliances: None  Vision: Visual Aid: None  Hearing Aid:    Jewelry:    Clothing:    Other Valuables:    Valuables sent to safe:      {Medication reconciliation information is now added to the patient's AVS automatically when it is printed.   There is no need to use this SmartLink in discharge instructions. Highlight this text and delete it to clear this message}        PATIENT INSTRUCTIONS:    After general anesthesia or intravenous sedation, for 24 hours or while taking prescription Narcotics:  · Limit your activities  · Do not drive and operate hazardous machinery  · Do not make important personal or business decisions  · Do  not drink alcoholic beverages  · If you have not urinated within 8 hours after discharge, please contact your surgeon on call. Report the following to your surgeon:  · Excessive pain, swelling, redness or odor of or around the surgical area  · Temperature over 100.5  · Nausea and vomiting lasting longer than 4 hours or if unable to take medications  · Any signs of decreased circulation or nerve impairment to extremity: change in color, persistent  numbness, tingling, coldness or increase pain  · Any questions      No orders of the defined types were placed in this encounter. What to do at Home:  Recommended activity: {discharge activity:94646}, ***    If you experience any of the following symptoms ***, please follow up with ***. *  Please give a list of your current medications to your Primary Care Provider. *  Please update this list whenever your medications are discontinued, doses are      changed, or new medications (including over-the-counter products) are added. *  Please carry medication information at all times in case of emergency situations. These are general instructions for a healthy lifestyle:    No smoking/ No tobacco products/ Avoid exposure to second hand smoke    Surgeon General's Warning:  Quitting smoking now greatly reduces serious risk to your health.     Obesity, smoking, and sedentary lifestyle greatly increases your risk for illness    A healthy diet, regular physical exercise & weight monitoring are important for maintaining a healthy lifestyle    You may be retaining fluid if you have a history of heart failure or if you experience any of the following symptoms:  Weight gain of 3 pounds or more overnight or 5 pounds in a week, increased swelling in our hands or feet or shortness of breath while lying flat in bed. Please call your doctor as soon as you notice any of these symptoms; do not wait until your next office visit. Recognize signs and symptoms of STROKE:    F-face looks uneven    A-arms unable to move or move unevenly    S-speech slurred or non-existent    T-time-call 911 as soon as signs and symptoms begin-DO NOT go       Back to bed or wait to see if you get better-TIME IS BRAIN. The discharge information has been reviewed with the {PATIENT PARENT GUARDIAN:69339}. The {PATIENT PARENT GUARDIAN:15848} verbalized understanding. Warning Signs of HEART ATTACK     Call 911 if you have these symptoms:   Chest discomfort. Most heart attacks involve discomfort in the center of the chest that lasts more than a few minutes, or that goes away and comes back. It can feel like uncomfortable pressure, squeezing, fullness, or pain.  Discomfort in other areas of the upper body. Symptoms can include pain or discomfort in one or both arms, the back, neck, jaw, or stomach.  Shortness of breath with or without chest discomfort.  Other signs may include breaking out in a cold sweat, nausea, or lightheadedness. Don't wait more than five minutes to call 911 - MINUTES MATTER! Fast action can save your life. Calling 911 is almost always the fastest way to get lifesaving treatment. Emergency Medical Services staff can begin treatment when they arrive -- up to an hour sooner than if someone gets to the hospital by car. The discharge information has been reviewed with the patient and caregiver. The patient and caregiver verbalized understanding. Discharge medications reviewed with the patient and guardian and appropriate educational materials and side effects teaching were provided.     Patient armband removed and shredded

## 2023-03-15 ENCOUNTER — HOSPITAL ENCOUNTER (OUTPATIENT)
Facility: HOSPITAL | Age: 33
Setting detail: RECURRING SERIES
Discharge: HOME | End: 2023-03-18
Payer: MEDICAID

## 2023-03-15 PROCEDURE — 97161 PT EVAL LOW COMPLEX 20 MIN: CPT

## 2023-03-15 NOTE — PROGRESS NOTES
PHYSICAL / OCCUPATIONAL THERAPY - DAILY TREATMENT NOTE (updated )    Patient Name: Kale Bautista    Date: 3/15/2023    : 1990  Insurance: Payor: [employer] / Plan: Beckie Schmidt PLUS / Product Type: *No Product type* /      Patient  verified yes     Visit #   Current / Total 1 16   Time   In / Out 4:08 pm 5:02 pm   Pain   In / Out 0 0   Subjective Functional Status/Changes: Pt is a 27year old male presenting with c/o left ankle pain with onset playing basketball noting he rolled his ankle and felt a crunching sound and pain in early 2022. Reports he was able to continued playing but notes pain and swelling afterwards that persisted with walking over the next few days. States he referred to orthopedics and was referred to PT. States he had x-rays at urgent care previously that were unremarkable for fracture or acute injury per pt. States he still gets swelling in his foot and ankle after walking too long and gets some crunching sensations in his left lateral ankle with movement. Reports he still feels unstable on the leg and does not feel comfortable running on it. States he has been using an elliptical for exercise. States he was told he had an injury to his peroneal tendon. States he was using a walking boot for a few weeks initially then decreased to as needed. States he will use an ankle brace when active. States his left knee is also bothering him and has been since prior to the ankle injury. Notes his right ankle has also been sore lately. Changes to:  Meds, Allergies, Med Hx, Sx Hx? If yes, update Summary List no       TREATMENT AREA =  Pain in left ankle and joints of left foot [M25.572]    OBJECTIVE    30 min [x]Eval                  []Re-Eval     Therapeutic Procedures:   Tx Min Billable or 1:1 Min (if diff from Tx Min) Procedure, Rationale, Specifics   12 ([place] medicaid)  [id number] Therapeutic Exercise (timed):  increase ROM, strength, coordination, balance, and proprioception to improve patient's ability to progress to PLOF and address remaining functional goals. (see flow sheet as applicable)     Details if applicable:   HEP instruction per handout   12 ([place] medicaid)  [id number] Self Care/Home Management (timed):  improve patient knowledge and understanding of pain reducing techniques, positioning, activity modification, diagnosis/prognosis, and physical therapy expectations, procedures and progression  to improve patient's ability to progress to PLOF and address remaining functional goals.   (see flow sheet as applicable)     Details if applicable:            Details if applicable:            Details if applicable:            Details if applicable:     47  [facility] Totals Reminder: bill using total billable min of TIMED therapeutic procedures (example: do not include dry needle or estim unattended, both untimed codes, in totals to left)  8-22 min = 1 unit; 23-37 min = 2 units; 38-52 min = 3 units; 53-67 min = 4 units; 68-82 min = 5 units   Total Total     [x]  Patient Education billed concurrently with other procedures   [x] Review HEP    [] Progressed/Changed HEP, detail:    [] Other detail:       Objective Information/Functional Measures/Assessment    SUBJECTIVE    Any medication changes, allergies to medications, adverse drug reactions, diagnosis change, or new procedure performed?: [x] No    [] Yes (see summary sheet for update)  Subjective functional status/changes:       PLOF: functionally independent, no AD, semi-active lifestyle, recreational basketball  Limitations to PLOF: limited ability to participate in running, prolonged walking, lateral movement, has not returned to basketball  Mechanism of Injury: see above  Current symptoms/Complaints: 0/10 at best; 2-3/10 at worst with prolonged walking; pt reports they are able to sleep through the night secondary to pain  Previous Treatment/Compliance: urgent care and orthopedics  Imaging/Diagnostic Testing: x-rays per pt, see above  PMHx/Surgical Hx: right ankle injuries previously (sprains), none per pt  Work Hx: see intake  Living Situation:   Pt Goals: \"Get back to playing basketball and be able to walk without pain. \"  Barriers: []pain []financial []time []transportation []other  Motivation: appears motivated and cooperative  Substance use: []Alcohol []Tobacco []other:   Cognition: A & O x 3      Medications: See intake documentation    OBJECTIVE/EXAMINATION    Physical Therapy Evaluation    Gait: [] Normal    [x] Abnormal    [] Antalgic    [] NWB    Device:  Describe: early heel rise bilat particularly early on left side with minimal heel strike during initial contact on left  Stairs:  Posture:    Range of Motion:   AROM PROM   Ankle Left Right Left Right   Plantarflexion 55 60 60 65   Dorsiflexion -2 0 0 2   Eversion 15 20 20 sore at end range 25   Inversion WNL WNL WNL WNL   1st MTP       Extension WNL WNL     Flexion WNL WNL        Strength (MMT):                                       Hip Left (0-5) Right (0-5)   Hip Flexion 5 5   Hip Extension     Hip ABD     Hip ADD     Hip ER     Hip IR       Knee Left (0-5) Right (0-5)   Knee Flexion 4 4+   Knee Extension 5 5   Ankle     Ankle PF 4 5   Ankle DF 5 5   Ankle EV 4 4+   Ankle INV 5 5   Other        Girth (in Centimeters):  Knee Left Right   2inches above midline of patella     Midline Patella     knee joint line     Tibial tuberosity     Figure 8 58.0 57.0   Malleoli     Midfoot     Metatarsal Head     Other:        Vasopnuematic compression justification:  Per bilateral girth measures taken and listed above the edema is considered significant and having an impact on the patient's balance and gait    Sensation: WNL grossly to light touch    Special Tests:   Ankle & Foot Left Right   Anterior Drawer - -   Talar Tilt - -   Inversion Stress - - (mild laxity on right)   Kleiger's (Ext Rotation) - -   Squeeze Test - -   Navicular Drop Test (4-8mm normal) nt nt   Windlass Test nt nt Flexibility: [] Unable to assess at this time  Gastroc:    (L) Tightness [] WNL   [x] Min   [] Mod   [] Severe    (R) Tightness [] WNL   [x] Min   [] Mod   [] Severe  Soleus:    (L) Tightness [] WNL   [x] Min   [] Mod   [] Severe    (R) Tightness [] WNL   [x] Min   [] Mod   [] Severe  Other:      (L) Tightness [] WNL   [] Min   [] Mod   [] Severe    (R) Tightness [] WNL   [] Min   [] Mod   [] Severe    Palpation:   Location: minimal TTP left peroneal tendons, left CFL  Patient's Pain Response: [x] Min   [] Mod   [] Severe  Location:  Patient's Pain Response: [] Min   [] Mod   [] Severe    Optional Tests:  Balance/Stork Test: touches/30sec (L): 1x balance deviation (R): 8x balance deviations with 2x agatha LOB    Other Tests / Comments:     ASSESSMENT/Changes in Function: Pt is a 28 yo male who [facility] to [facility] with c/o left ankle pain after onset during acute injury playing basketball in November 2022. Pt presents with sign and symptoms consistent with lateral ankle sprain/strain with associated deficits in ankle DF ROM, mild left ankle circumferential edema, impaired balance, and weakness with ankle PF and EV with associated latered gait mechanics and reported inability to run or perform lateral movements due to instability. Pt also presents with deficits in right ankle DF ROM. Pt will benefit from skilled Pt to address their impairments and facilitate improved functional status. Patient will continue to benefit from skilled PT / OT services to modify and progress therapeutic interventions, analyze and address functional mobility deficits, analyze and address ROM deficits, analyze and address strength deficits, analyze and address soft tissue restrictions, analyze and cue for proper movement patterns, analyze and address imbalance/dizziness, and instruct in home and community integration to address functional deficits and attain remaining goals.     Progress toward goals / Updated goals:  []  See Progress Note/Recertification  Short Term Goals: To be accomplished in 4 weeks   Patient will be independent and compliant with HEP to progress toward goals and restore functional mobility. Eval Status: issued at eval    Pt will demonstrate 10 degrees ankle DF PROM to aid in functional mechanics for ambulation/ADLs. Eval Status: right 2, left 0    Long Term Goals: To be accomplished in 8 weeks   Patient will improve FOTO score to 71 points to indicate significant improvement in functional status. Eval Status: FOTO 52  FOTO score = an established functional score where 100 = no disability    Pt will demonstrate ability to perform SLS for 30 seconds with no more than 1 balance deviation to improve stance stability  Eval Status: 8 balance deviations in 30 seconds    Pt will have 5/5 left ankle PF and EV strength to return to goals of running void of instability. Eval Status: 4/5    Patient will demonstrate ability to perform single leg hop in place x 10 reps void of pain with good landing control to facilitate progression towards plyometrics and return to PLOF. Eval Status: unable due to pain and fear avoidance of activity    Pt demonstrate ability to ambulate with good mechanics void of compensatory movement or pain to improve ease of community mobility. Eval Status: early heel rise, minimal heel strike during initial contact on left side    6. Pt will demonstrate ability to jog on TM for 5 min at 5.0 mph void of pain to improve ease of return to sport progression. Eval: unable due to pain and fear avoidance of activity    PLAN  yes Continue plan of care  [x]  Upgrade activities as tolerated  []  Discharge due to :  []  Other:    Jerry Valencia, PT    3/15/2023    4:03 PM    No future appointments.

## 2023-03-15 NOTE — PROGRESS NOTES
[facility] at the [facility]   [address], [address] [facility], [address] [facility]   Phone: [phone]     Fax: [phone]       Plan of Care/ Statement of Necessity for Physical Therapy Services    Patient name: Sinan Gatica Start of Care: 3/15/2023   Referral source: MAYURI Bateman* : 1990    Medical Diagnosis: Pain in left ankle and joints of left foot [M25.572]       Onset Date:2022    Treatment Diagnosis: M79.662  Pain in left lower leg                             Prior Hospitalization: see medical history Provider#: [id number]   Medications: Verified on Patient Summary List     Comorbidities: hx right ankle sprains  Prior Level of Function: functionally independent, no AD, semi-active lifestyle, recreational basketball      The Plan of Care and following information is based on the information from the initial evaluation. Assessment/ key information: Pt is a 26 yo male who presents to [facility] with c/o left ankle pain after onset during acute injury playing basketball in 2022. Pt presents with sign and symptoms consistent with lateral ankle sprain/strain with associated deficits in ankle DF ROM, mild left ankle circumferential edema, impaired balance, and weakness with ankle PF and EV with associated latered gait mechanics and reported inability to run or perform lateral movements due to instability. Pt also presents with deficits in right ankle DF ROM. Pt will benefit from skilled PT to address their impairments and facilitate improved functional status.   Evaluation Complexity HistoryMEDIUM  Complexity : 1-2 comorbidities / personal factors will impact the outcome/ POC  ; Examination LOW Complexity : 1-2 Standardized tests and measures addressing body structure, function, activity limitation and / or participation in recreation  ;Presentation LOW Complexity : Stable, uncomplicated  ;Clinical Decision Making MEDIUM Complexity : FOTO score of 26-74 FOTO score = an established functional score where 100 = no disability  Overall Complexity Rating: LOW   Problem List: pain affecting function, decrease ROM, decrease strength, edema affection function, impaired gait/balance, decrease ADL/functional abilities, decrease activity tolerance, and decrease flexibility/joint mobility   Treatment Plan may include any combination of the followin Therapeutic Exercise, 18510 Neuromuscular Re-Education, 27587 Manual Therapy, 48930 Therapeutic Activity, 43998 Self Care/Home Management, 66121 Electrical Stim unattended, 10889 Vasopneumatic Device, 14773 Gait Training, 80000 Needle Insertion w/o Injection (1 or 2 muscles), and 24783 Needle Insertion w/o Injection (3+ muscles)  Vasopnuematic compression justification:  Per bilateral girth measures taken and listed above the edema is considered significant and having an impact on the patient's strength, balance, and gait  Patient / Family readiness to learn indicated by: asking questions, trying to perform skills, and interest  Persons(s) to be included in education: patient (P)  Barriers to Learning/Limitations: None  Measures taken if barriers to learning present: n/a  Patient Goal (s): \"Get back to playing basketball and be able to walk without pain. \"  Patient Self Reported Health Status: fair  Rehabilitation Potential: excellent    Short Term Goals: To be accomplished in 4 weeks              Patient will be independent and compliant with HEP to progress toward goals and restore functional mobility. Eval Status: issued at eval     Pt will demonstrate 10 degrees ankle DF PROM to aid in functional mechanics for ambulation/ADLs. Eval Status: right 2, left 0     Long Term Goals: To be accomplished in 8 weeks   Patient will improve FOTO score to 71 points to indicate significant improvement in functional status.   Eval Status: FOTO 49  FOTO score = an established functional score where 100 = no disability Pt will demonstrate ability to perform SLS for 30 seconds with no more than 1 balance deviation to improve stance stability  Eval Status: 8 balance deviations in 30 seconds     Pt will have 5/5 left ankle PF and EV strength to return to goals of running void of instability. Eval Status: 4/5     Patient will demonstrate ability to perform single leg hop in place x 10 reps void of pain with good landing control to facilitate progression towards plyometrics and return to PLOF. Eval Status: unable due to pain and fear avoidance of activity     Pt demonstrate ability to ambulate with good mechanics void of compensatory movement or pain to improve ease of community mobility. Eval Status: early heel rise, minimal heel strike during initial contact on left side     6. Pt will demonstrate ability to jog on TM for 5 min at 5.0 mph void of pain to improve ease of return to sport progression. Eval: unable due to pain and fear avoidance of activity  Frequency / Duration: Patient to be seen 2 times per week for 8 weeks    Patient/ Caregiver education and instruction: Diagnosis, prognosis, self care, activity modification, brace/ splint application, and exercises    [x]  Plan of care has been reviewed with PTA    Certification Period: n/a  Bee Ibarra, PT 3/15/2023 6:05 PM  _____________________________________________________________________  I certify that the above Therapy Services are being furnished while the patient is under my care. I agree with the treatment plan and certify that this therapy is necessary. [de-identified] Signature:________________________Date:_________TIME:________                                      MAYURI Toribio*         ** Signature, Date and Time must be completed for valid certification **    Insurance: Payor: Riley Bates / Plan: [place] [employer] PLUS / Product Type: *No Product type* /      Please sign and return to :   [facility] at the [address], [facility], [address] [facility]           Phone: [phone]      Fax:  [phone]

## (undated) DEVICE — NDL FLTR TIP 5 MIC 18GX1.5IN --

## (undated) DEVICE — KENDALL RADIOLUCENT FOAM MONITORING ELECTRODE RECTANGULAR SHAPE: Brand: KENDALL

## (undated) DEVICE — SYR 5ML 1/5 GRAD LL NSAF LF --

## (undated) DEVICE — CATH SUC CTRL PRT TRIFLO 14FR --

## (undated) DEVICE — ERBE NESSY®PLATE 170 SPLIT; 168CM²; CABLE 3M: Brand: ERBE

## (undated) DEVICE — SNARE POLYP SM W13MMXL240CM SHTH DIA2.4MM OVL FLX DISP

## (undated) DEVICE — SYRINGE 50ML E/T

## (undated) DEVICE — SPONGE GZ W4XL4IN RAYON POLY 4 PLY NONWOVEN FASTER WICKING

## (undated) DEVICE — WRISTBAND ID AD W2.5XL9.5CM RED VYN ADH CLSR UNI-PRINT

## (undated) DEVICE — CANNULA CUSH AD W/ 14FT TBG

## (undated) DEVICE — SOLUTION IV 500ML 0.9% SOD CHL FLX CONT

## (undated) DEVICE — TRAP SPEC COLL POLYP POLYSTYR --

## (undated) DEVICE — CATH IV SAFE STR 22GX1IN BLU -- PROTECTIV PLUS

## (undated) DEVICE — NDL PRT INJ NSAF BLNT 18GX1.5 --

## (undated) DEVICE — GARMENT,MEDLINE,DVT,INT,CALF,MED, GEN2: Brand: MEDLINE

## (undated) DEVICE — MAJ-1414 SINGLE USE ADPATER BIOPSY VALV: Brand: SINGLE USE ADAPTOR BIOPSY VALVE

## (undated) DEVICE — SPONGE GZ W4XL4IN COT 12 PLY TYP VII WVN C FLD DSGN

## (undated) DEVICE — SYR 3ML LL TIP 1/10ML GRAD --

## (undated) DEVICE — TRNQT TEXT 1X18IN BLU LF DISP -- CONVERT TO ITEM 362165

## (undated) DEVICE — Device

## (undated) DEVICE — TUBING, SUCTION, 1/4" X 12', STRAIGHT: Brand: MEDLINE

## (undated) DEVICE — SET ADMIN 16ML TBNG L100IN 2 Y INJ SITE IV PIGGY BK DISP

## (undated) DEVICE — SINGLE PORT MANIFOLD: Brand: NEPTUNE 2